# Patient Record
Sex: FEMALE | Race: WHITE | NOT HISPANIC OR LATINO | Employment: UNEMPLOYED | URBAN - METROPOLITAN AREA
[De-identification: names, ages, dates, MRNs, and addresses within clinical notes are randomized per-mention and may not be internally consistent; named-entity substitution may affect disease eponyms.]

---

## 2019-05-15 ENCOUNTER — OFFICE VISIT (OUTPATIENT)
Dept: GASTROENTEROLOGY | Facility: CLINIC | Age: 69
End: 2019-05-15

## 2019-05-15 VITALS — BODY MASS INDEX: 20.51 KG/M2 | WEIGHT: 127.6 LBS | HEIGHT: 66 IN | TEMPERATURE: 98.6 F

## 2019-05-15 DIAGNOSIS — R10.33 PERIUMBILICAL ABDOMINAL PAIN: Primary | ICD-10-CM

## 2019-05-15 DIAGNOSIS — Z90.49 H/O TOTAL COLECTOMY: ICD-10-CM

## 2019-05-15 DIAGNOSIS — K31.89 MASS OF STOMACH: ICD-10-CM

## 2019-05-15 DIAGNOSIS — Z85.038 HISTORY OF COLON CANCER: ICD-10-CM

## 2019-05-15 PROCEDURE — 99204 OFFICE O/P NEW MOD 45 MIN: CPT | Performed by: INTERNAL MEDICINE

## 2019-05-15 RX ORDER — MONTELUKAST SODIUM 10 MG/1
10 TABLET ORAL NIGHTLY
COMMUNITY
Start: 2019-03-01

## 2019-05-15 RX ORDER — DULOXETIN HYDROCHLORIDE 60 MG/1
60 CAPSULE, DELAYED RELEASE ORAL DAILY
COMMUNITY
Start: 2019-03-01

## 2019-05-15 RX ORDER — LEVOTHYROXINE SODIUM 0.03 MG/1
25 TABLET ORAL DAILY
COMMUNITY
Start: 2019-03-01

## 2019-05-15 RX ORDER — OMEPRAZOLE 40 MG/1
CAPSULE, DELAYED RELEASE ORAL
COMMUNITY
Start: 2019-03-01 | End: 2019-09-25

## 2019-05-15 RX ORDER — MELATONIN
1000 DAILY
COMMUNITY

## 2019-05-15 RX ORDER — LOPERAMIDE HCL 1 MG/7.5ML
2 SOLUTION ORAL
Qty: 237 ML | Refills: 5 | Status: SHIPPED | OUTPATIENT
Start: 2019-05-15 | End: 2019-12-02

## 2019-05-15 RX ORDER — TIOTROPIUM BROMIDE INHALATION SPRAY 3.12 UG/1
2 SPRAY, METERED RESPIRATORY (INHALATION) DAILY
COMMUNITY
Start: 2019-03-18

## 2019-05-15 RX ORDER — POTASSIUM CHLORIDE 1500 MG/1
10 TABLET, EXTENDED RELEASE ORAL DAILY
COMMUNITY
Start: 2019-03-01

## 2019-05-15 RX ORDER — BUSPIRONE HYDROCHLORIDE 10 MG/1
10 TABLET ORAL 3 TIMES DAILY
COMMUNITY
Start: 2019-03-01

## 2019-05-15 RX ORDER — ATORVASTATIN CALCIUM 40 MG/1
40 TABLET, FILM COATED ORAL DAILY
COMMUNITY
Start: 2019-03-01

## 2019-05-15 RX ORDER — TRAMADOL HYDROCHLORIDE 50 MG/1
TABLET ORAL
COMMUNITY
Start: 2019-04-16 | End: 2019-06-28 | Stop reason: SDUPTHER

## 2019-05-15 NOTE — PATIENT INSTRUCTIONS
Stop Gattex    Start citrucel twice a day to bulk stool    Ok for imodium up to 3 times a day to control stools    Boost/ensure 4 times per day    Labs today

## 2019-05-15 NOTE — PROGRESS NOTES
"Chief Complaint   Patient presents with   • Stomach mass   • Abdominal Pain       Subjective     HPI    Donna Banerjee is a 68 y.o. female with a past medical history noted below who presents for evaluation of abdominal pain, history of colon cancer, reports of a stomach.  Unfortunately, she has had varied medical care in various locations due to needing to live with relatives who live in different areas.  She is not the best historian either.  Her history is notable for microsatellite unstable colon cancer that was found in May 2016.  She had a right hemicolectomy at that time.  She then moved to [place] and apparently had a total colectomy there.  She has an ileostomy.  Then she moved to [facility] and apparently followed with Dr Dixon.    She is complaining to me today of abdominal pain.  Pain has been occurring since her resection in 2017.  Located mid abdomen, near her ostomy.  Constant in timing.  \"Intense\" sharp pain.  Better with tramadol.  Nothing worsens it.  Had a US with a PCP in [place] and was told that she has a stomach mass.      She reports constant high volume ostomy output since her ileostomy.  She denies that any part of her small bowel was removed.  Interestingly, she was put on Gattex by her doctor in [place] though she does not have a short gut.  She has not been on fiber nor Imodium to try to slow her ostomy output down.  She says that she has not noticed much change during the month that she has been on Gattex but that her stoma has increased in size and is puffier.  She says that she has had progressive weight loss, she says that she used to weigh 200 pounds prior to her surgery.    She moved back to [place] to live with a dtr and is establishing with Rae Gutierrez as her PCP    Smokes.  No alcohol.    Total colectomy, has had 2 c sections.    Not working, used to     No known family history of GI malignancy.    Past Medical History:   Diagnosis Date   • Anemia    • Ascites  "   • Breast cancer (CMS/[facility]) 2014   • Cancer (CMS/[facility]) 2016    colon cancer-stage II   • COPD (chronic obstructive pulmonary disease) (CMS/[facility])    • Depression    • Diverticulosis    • Emphysema lung (CMS/[facility])    • Gout    • Hyperlipidemia    • Thyroid disease          Current Outpatient Medications:   •  atorvastatin (LIPITOR) 40 MG tablet, , Disp: , Rfl:   •  busPIRone (BUSPAR) 10 MG tablet, , Disp: , Rfl:   •  cholecalciferol (VITAMIN D3) 1000 units tablet, Take 1,000 Units by mouth Daily., Disp: , Rfl:   •  DULoxetine (CYMBALTA) 60 MG capsule, , Disp: , Rfl:   •  KLOR-CON 20 MEQ CR tablet, , Disp: , Rfl:   •  levothyroxine (SYNTHROID, LEVOTHROID) 25 MCG tablet, , Disp: , Rfl:   •  MAGNESIUM OXIDE 400 PO, , Disp: , Rfl:   •  montelukast (SINGULAIR) 10 MG tablet, , Disp: , Rfl:   •  Multiple Vitamins-Minerals (MULTIVITAMIN ADULT PO), , Disp: , Rfl:   •  omeprazole (priLOSEC) 40 MG capsule, , Disp: , Rfl:   •  SPIRIVA RESPIMAT 2.5 MCG/ACT aerosol solution inhaler, , Disp: , Rfl:   •  traMADol (ULTRAM) 50 MG tablet, , Disp: , Rfl:   •  loperamide (IMODIUM A-D) 1 MG/7.5ML suspension, Take 15 mL by mouth 2 (Two) Times a Day Before Meals., Disp: 237 mL, Rfl: 5  •  methylcellulose oral powder, Take 10 application by mouth 2 (Two) Times a Day Before Meals., Disp: 84960 g, Rfl: 5    Allergies   Allergen Reactions   • Latex Unknown (See Comments)     unknown   • Sulfa Antibiotics Rash       Social History     Socioeconomic History   • Marital status:      Spouse name: Not on file   • Number of children: Not on file   • Years of education: Not on file   • Highest education level: Not on file   Tobacco Use   • Smoking status: Current Every Day Smoker     Packs/day: 0.25     Start date: 1960   • Smokeless tobacco: Never Used   Substance and Sexual Activity   • Alcohol use: No     Frequency: Never   • Drug use: Yes     Types: Marijuana       Family History   Family history unknown: Yes       Review of Systems    Constitutional: Positive for unexpected weight change. Negative for activity change, appetite change and fatigue.   HENT: Negative for sore throat and trouble swallowing.    Respiratory: Negative.    Cardiovascular: Negative.    Gastrointestinal: Positive for abdominal pain. Negative for abdominal distention and blood in stool.        High ostomy output   Endocrine: Negative for cold intolerance and heat intolerance.   Genitourinary: Negative for difficulty urinating, dysuria and frequency.        Decreased urine output   Musculoskeletal: Negative for arthralgias, back pain and myalgias.   Skin: Negative.    Hematological: Negative for adenopathy. Does not bruise/bleed easily.   All other systems reviewed and are negative.      Objective     Vitals:    05/15/19 1331   Temp: 98.6 °F (37 °C)         05/15/19  1331   Weight: 57.9 kg (127 lb 9.6 oz)     Body mass index is 20.6 kg/m².    Physical Exam   Constitutional: She is oriented to person, place, and time. No distress.   Thin, chronically ill-appearing   HENT:   Head: Normocephalic and atraumatic.   Right Ear: External ear normal.   Left Ear: External ear normal.   Nose: Nose normal.   Mouth/Throat: Oropharynx is clear and moist.   Missing multiple teeth in her lower mouth   Eyes: Conjunctivae and EOM are normal. Right eye exhibits no discharge. Left eye exhibits no discharge. No scleral icterus.   Neck: Normal range of motion. Neck supple. No thyromegaly present.   No supraclavicular adenopathy   Cardiovascular: Normal rate, regular rhythm, normal heart sounds and intact distal pulses. Exam reveals no gallop.   No murmur heard.  No lower extremity edema   Pulmonary/Chest: Effort normal and breath sounds normal. No respiratory distress. She has no wheezes.   Abdominal: Soft. Normal appearance and bowel sounds are normal. She exhibits no distension and no mass. There is no hepatosplenomegaly. There is no tenderness. There is no rigidity, no rebound and no  guarding. No hernia.   Ostomy with liquid brown stool, no palpable mass   Genitourinary:   Genitourinary Comments: Rectal exam deferred   Musculoskeletal: Normal range of motion. She exhibits no edema or tenderness.   No atrophy of upper or lower extremities.  Normal digits and nails of both hands.   Lymphadenopathy:     She has no cervical adenopathy.   Neurological: She is alert and oriented to person, place, and time. She displays no atrophy. Coordination normal.   Skin: Skin is warm and dry. No rash noted. She is not diaphoretic. No erythema.   Psychiatric: She has a normal mood and affect. Her behavior is normal. Judgment and thought content normal.   Vitals reviewed.      No results found for: WBC, RBC, HGB, HCT, MCV, MCH, MCHC, RDW, RDWSD, MPV, PLT, NEUTRORELPCT, LYMPHORELPCT, MONORELPCT, EOSRELPCT, BASORELPCT, AUTOIGPER, NEUTROABS, LYMPHSABS, MONOSABS, EOSABS, BASOSABS, AUTOIGNUM, NRBC    No results found for: GLUCOSE, NA, K, CO2, CL, ANIONGAP, CREATININE, BUN, BCR, CALCIUM, EGFRIFNONA, ALKPHOS, PROTEINTOT, ALT, AST, BILITOT, ALBUMIN, GLOB, LABIL2      Imaging Results (last 7 days)     ** No results found for the last 168 hours. **            No notes on file    Assessment/Plan    Abdominal pain    Status post ileostomy    History of colon cancer status post colectomy    Reports of mass in the stomach on ultrasound imaging    Plan  CBC, CMP today  CT of the abdomen and pelvis to further evaluate reports of her stomach mass  We will get Dr. Sanchez's records  Stop Gattex  Start fiber supplemenation, imodium to control ostomy output  Increase boost, ensure to 4 bottles daily-- samples given    Overall I am concerned about her lack of permanent housing as her multiple moves will complicate her medical care.    Donna was seen today for stomach mass and abdominal pain.    Diagnoses and all orders for this visit:    Periumbilical abdominal pain  -     CBC & Differential  -     Comprehensive Metabolic Panel  -      CT Abdomen Pelvis With Contrast; Future    History of colon cancer  -     CBC & Differential  -     Comprehensive Metabolic Panel  -     CT Abdomen Pelvis With Contrast; Future    H/O total colectomy  -     CT Abdomen Pelvis With Contrast; Future    Mass of stomach  -     CBC & Differential  -     Comprehensive Metabolic Panel  -     CT Abdomen Pelvis With Contrast; Future    Other orders  -     loperamide (IMODIUM A-D) 1 MG/7.5ML suspension; Take 15 mL by mouth 2 (Two) Times a Day Before Meals.  -     methylcellulose oral powder; Take 10 application by mouth 2 (Two) Times a Day Before Meals.        I have discussed the above plan with the patient.  They verbalize understanding and are in agreement with the plan.  They have been advised to contact the office for any questions, concerns, or changes related to their health.    Dictated utilizing Dragon dictation

## 2019-05-16 ENCOUNTER — TELEPHONE (OUTPATIENT)
Dept: GASTROENTEROLOGY | Facility: CLINIC | Age: 69
End: 2019-05-16

## 2019-05-16 DIAGNOSIS — Z85.038 HISTORY OF COLON CANCER: Primary | ICD-10-CM

## 2019-05-16 NOTE — TELEPHONE ENCOUNTER
Records received from [facility] physicians and scanned under media tab.  Dr Baker notified.

## 2019-05-16 NOTE — TELEPHONE ENCOUNTER
Called Dr Fowler at [phone] and spoke with Zuleyma and requested pt's records be faxed to [phone].

## 2019-05-16 NOTE — TELEPHONE ENCOUNTER
----- Message from Nelli Baker MD sent at 5/15/2019  4:43 PM EDT -----  She saw a Dr Cortes in [place]-- can we pls get records, thx

## 2019-05-20 ENCOUNTER — TELEPHONE (OUTPATIENT)
Dept: GASTROENTEROLOGY | Facility: CLINIC | Age: 69
End: 2019-05-20

## 2019-05-20 NOTE — TELEPHONE ENCOUNTER
----- Message from Rachel Campos, RN sent at 5/20/2019  1:21 PM EDT -----  Regarding: FW: Question-Patient Medication Discontinuation  Contact: [phone]      ----- Message -----  From: Francoise Kimbrough  Sent: 5/20/2019   1:19 PM  To: [facility]  Subject: Question-Patient Medication Discontinuation      Berry from [facility] would like a call back regarding medication discontinuation for patient.     ThanksFrancoise

## 2019-05-20 NOTE — TELEPHONE ENCOUNTER
Received call from Berry from [facility] advising that they have faxed a dc order for the gattex and need Dr Baker' signature. Adised will place form on Dr Baker desk and will fax when signed. She verb understanding.

## 2019-05-22 NOTE — TELEPHONE ENCOUNTER
Discontinuation order for gattex faxed to [facility] at [phone] and confirmation received.  Form scanned under media tab.

## 2019-06-27 NOTE — PROGRESS NOTES
Subjective Patient describes circuitous route of treatment over many years.  She is now returned to [place] to live with 1 of her daughters and wishes to have us resume her care.    REASON FOR CONSULTATION: Resumption of oncologic care  Provide an opinion on any further workup or treatment                             REQUESTING PHYSICIAN: SELIN Strange    RECORDS OBTAINED:  Records of the patients history including those obtained from the referring provider were reviewed and summarized in detail.    HISTORY OF PRESENT ILLNESS:  The patient is a 68 y.o. year old female who is here for an opinion about the above issue.    History of Present Illness       The patient is a 68-year-old female with a past history of stage I breast carcinoma, ER, HI negative, HER-2 positive in 2008 status post left mastectomy and neoadjuvant chemotherapy with 6 cycles of Taxotere and Carboplatin as well as 1 year of Herceptin.  Additional history includes stage II colon cancer in May 2016 undergoing open colectomy in [place]-T3 N0 M0 with no additional therapy except a subsequent history of elevated CEA level documented up to 54.6 March 21, 2017 and subsequent PET/CT negative.  Records indicate in May 2017 a MUTYH mutation documented and subsequent endoscopy performed though this became progressively more difficult to do leading to ileostomy placement ultimately and flexible sigmoidoscopy was thereafter relatively consistently.  She has not demonstrated any evidence recurrence undergoing CT scans in February 2018 and March 2018   Records include a PET/CT have not been performed  April 12, 2018 with mild increased activity in the left lower lobe pulmonary infiltrate thought to be inflammatory when compared to previous CT March 24, 2017.  More recently she had an ultrasound performed February 6, 2019 when a lump was felt in the abdominal wall anteriorly demonstrating a nonspecific mass deep to the anterior wall thought to  be a postoperative hematoma?  At the time she was living in [place] an and ultrasound (performed ) revealed a 4.8 cm x 3.4 cm x 0.8 cm hypoechoic mass with irregular margins just deep to the abdominal wall .  The patient states that she was never advised as to the significance of these findings but she was also moving from [place] back to [place].  It does appear that a follow-up CT was planned at least per schedule.     The patient has started with a new primary care physician with plans to adjust her pain medications, treatment of her depression and now she is seeking follow-up oncologic care.  Again her story is somewhat convoluted and we have spent considerable time trying to be certain that the above is accurate.         Past Medical History:   Diagnosis Date   • Acute kidney injury (CMS/HCC)    • Adenovirus infection    • Ascites    • Breast cancer (CMS/HCC)    • Cancer (CMS/HCC)     colon cancer-stage II   • Choledocholithiasis    • Colitis    • COPD (chronic obstructive pulmonary disease) (CMS/HCC)    • Depression    • Diverticulosis    • DJD (degenerative joint disease)    • E. coli UTI    • Emphysema lung (CMS/HCC)    • Enterovirus infection    • Fibromyalgia    • Gout    • H/O Anemia     Microcytic anemia   • H/O Streptococcus pneumoniae infection    • Herpes simplex type 1 infection    • History of urinary tract infection    • Hyperlipidemia    • Rhinovirus infection    • Staph aureus infection    • Thyroid disease     Hypothyroidism        Past Surgical History:   Procedure Laterality Date   • BRONCHOSCOPY  ,    •  SECTION     • CHOLECYSTECTOMY     • COLON RESECTION     • COLONOSCOPY  2016    colon cancer per patient   • ELBOW ARTHROPLASTY     • ERCP WITH SPHINCTEROTOMY/PAPILLOTOMY     • EXPLORATORY LAPAROTOMY      Ventral hernia repair   • ILEOSTOMY     • MASTECTOMY     • NASAL SEPTUM SURGERY     • PORTACATH PLACEMENT     • SIGMOIDOSCOPY     •  THYROIDECTOMY, PARTIAL     • TONSILLECTOMY          Current Outpatient Medications on File Prior to Visit   Medication Sig Dispense Refill   • atorvastatin (LIPITOR) 40 MG tablet      • busPIRone (BUSPAR) 10 MG tablet      • cholecalciferol (VITAMIN D3) 1000 units tablet Take 1,000 Units by mouth Daily.     • DULoxetine (CYMBALTA) 60 MG capsule      • KLOR-CON 20 MEQ CR tablet      • levothyroxine (SYNTHROID, LEVOTHROID) 25 MCG tablet      • MAGNESIUM OXIDE 400 PO      • montelukast (SINGULAIR) 10 MG tablet      • Multiple Vitamins-Minerals (MULTIVITAMIN ADULT PO)      • omeprazole (priLOSEC) 40 MG capsule      • [DISCONTINUED] traMADol (ULTRAM) 50 MG tablet      • loperamide (IMODIUM A-D) 1 MG/7.5ML suspension Take 15 mL by mouth 2 (Two) Times a Day Before Meals. 237 mL 5   • methylcellulose oral powder Take 10 application by mouth 2 (Two) Times a Day Before Meals. 15843 g 5   • SPIRIVA RESPIMAT 2.5 MCG/ACT aerosol solution inhaler        No current facility-administered medications on file prior to visit.         ALLERGIES:    Allergies   Allergen Reactions   • Latex Unknown (See Comments)     unknown   • Sulfa Antibiotics Rash        Social History     Socioeconomic History   • Marital status:      Spouse name: Not on file   • Number of children: Not on file   • Years of education: Not on file   • Highest education level: Not on file   Occupational History   • Occupation:      Employer: UNEMPLOYED   Tobacco Use   • Smoking status: Current Every Day Smoker     Packs/day: 0.25     Start date: 1960   • Smokeless tobacco: Never Used   Substance and Sexual Activity   • Alcohol use: No     Frequency: Never   • Drug use: Yes     Types: Marijuana   • Sexual activity: Defer        Family History   Problem Relation Age of Onset   • Asthma Brother    • Hyperlipidemia Daughter         Review of Systems   Constitutional: Positive for diaphoresis, fatigue and unexpected weight change.   HENT: Positive for  "hearing loss.    Eyes: Positive for visual disturbance.   Respiratory: Negative.    Cardiovascular: Negative.    Gastrointestinal: Positive for abdominal pain.   Endocrine: Positive for cold intolerance and heat intolerance.   Genitourinary: Negative.    Musculoskeletal: Positive for back pain.   Allergic/Immunologic: Negative.    Neurological: Negative.         Objective     Vitals:    06/28/19 1358   BP: 130/55   Pulse: 68   Resp: 18   Temp: 97.8 °F (36.6 °C)   TempSrc: Oral   SpO2: 100%   Weight: 59.3 kg (130 lb 12.8 oz)   Height: 167.6 cm (65.98\")  Comment: new patient height   PainSc:   2   PainLoc: Abdomen     Current Status 6/28/2019   ECOG score 0       Physical Exam   Constitutional: She is oriented to person, place, and time. She appears well-developed and well-nourished.   HENT:   Head: Normocephalic and atraumatic.   Mouth/Throat: Oropharynx is clear and moist.   Eyes: Conjunctivae and EOM are normal. Pupils are equal, round, and reactive to light.   Neck: Normal range of motion. Neck supple.   Cardiovascular: Normal rate, regular rhythm, normal heart sounds and intact distal pulses.   Pulmonary/Chest: Effort normal and breath sounds normal.   Abdominal: Soft. Bowel sounds are normal. She exhibits mass (Fullness felt just to the left of the umbilicus).   Ileostomy placement intact and functioning   Musculoskeletal: Normal range of motion.   Neurological: She is alert and oriented to person, place, and time.   Skin: Skin is warm and dry.   Psychiatric: She has a normal mood and affect. Her behavior is normal.         RECENT LABS:  Hematology WBC   Date Value Ref Range Status   06/28/2019 9.54 3.40 - 10.80 10*3/mm3 Final     RBC   Date Value Ref Range Status   06/28/2019 4.21 3.77 - 5.28 10*6/mm3 Final     Hemoglobin   Date Value Ref Range Status   06/28/2019 12.3 12.0 - 15.9 g/dL Final     Hematocrit   Date Value Ref Range Status   06/28/2019 38.6 34.0 - 46.6 % Final     Platelets   Date Value Ref Range " Status   06/28/2019 190 140 - 450 10*3/mm3 Final          Assessment/Plan        68-year-old female who presents for resumption of oncologic care.  This includes a stage I breast cancer in 2008 status post neoadjuvant therapy and Herceptin for the balance of the year following as well as a history of stage II colon carcinoma (T3 N0 M0) in May 2016.  She was not offered chemotherapy but did have an elevated CEA of uncertain significance through 3/2017.  She had a number of studies to document whether she had recurrent disease all negative but she was eventually determined to have a MUTYH mutation-evidently MUTYH associated polyposis (MAP) leading to recurrent endoscopies.  These became difficult to do secondary to inability to pass even pediatric scopes and eventually she required surgery and placement of ileostomy.  The patient has been in several locations including [place] for period of time, [facility] as well as [facility] with portions of her treatment provided at each location.  We have spent considerable time trying to piece of her history together today leading to the finding more recently in the last several months of potential mass in the anterior abdominal wall.  This was assessed by ultrasound (described above) though the assessment of this has, evidently, not been completed.  She now presents to our practice for follow-up and, under the circumstances, is requested to have a follow-up exam with a PET/CT rather than CT to determine whether the area in question are metabolically active.  Plan:  *PET/CT in the next 2 to 3 weeks  *Baseline CEA, CA 15-3 and CMP  *Pain medications refilled at present until the area in question can be delineated as to benign or malignant.  Upon return we will discuss how her pain management will continue.  *Follow-up approximately 5 days after PET/CT is completed  *Port maintenance- reinitiate port flush every 4 to 6 weeks

## 2019-06-28 ENCOUNTER — LAB (OUTPATIENT)
Dept: LAB | Facility: HOSPITAL | Age: 69
End: 2019-06-28

## 2019-06-28 ENCOUNTER — CONSULT (OUTPATIENT)
Dept: ONCOLOGY | Facility: CLINIC | Age: 69
End: 2019-06-28

## 2019-06-28 VITALS
RESPIRATION RATE: 18 BRPM | HEART RATE: 68 BPM | DIASTOLIC BLOOD PRESSURE: 55 MMHG | WEIGHT: 130.8 LBS | OXYGEN SATURATION: 100 % | BODY MASS INDEX: 21.02 KG/M2 | SYSTOLIC BLOOD PRESSURE: 130 MMHG | TEMPERATURE: 97.8 F | HEIGHT: 66 IN

## 2019-06-28 DIAGNOSIS — R19.04 ABDOMINAL MASS, LLQ (LEFT LOWER QUADRANT): ICD-10-CM

## 2019-06-28 DIAGNOSIS — Z90.49 H/O TOTAL COLECTOMY: Primary | ICD-10-CM

## 2019-06-28 DIAGNOSIS — Z85.038 HISTORY OF COLON CANCER: ICD-10-CM

## 2019-06-28 DIAGNOSIS — Z85.3 HISTORY OF BREAST CANCER: ICD-10-CM

## 2019-06-28 DIAGNOSIS — Z85.038 HISTORY OF COLON CANCER: Primary | ICD-10-CM

## 2019-06-28 LAB
ALBUMIN SERPL-MCNC: 4.5 G/DL (ref 3.5–5.2)
ALBUMIN/GLOB SERPL: 1.9 G/DL (ref 1.1–2.4)
ALP SERPL-CCNC: 80 U/L (ref 38–116)
ALT SERPL W P-5'-P-CCNC: 18 U/L (ref 0–33)
ANION GAP SERPL CALCULATED.3IONS-SCNC: 10.5 MMOL/L (ref 5–15)
AST SERPL-CCNC: 24 U/L (ref 0–32)
BASOPHILS # BLD AUTO: 0.06 10*3/MM3 (ref 0–0.2)
BASOPHILS NFR BLD AUTO: 0.6 % (ref 0–1.5)
BILIRUB SERPL-MCNC: 0.4 MG/DL (ref 0.2–1.2)
BUN BLD-MCNC: 24 MG/DL (ref 6–20)
BUN/CREAT SERPL: 16.1 (ref 7.3–30)
CALCIUM SPEC-SCNC: 9.9 MG/DL (ref 8.5–10.2)
CANCER AG15-3 SERPL-ACNC: 17 U/ML
CEA SERPL-MCNC: 14.4 NG/ML
CHLORIDE SERPL-SCNC: 103 MMOL/L (ref 98–107)
CO2 SERPL-SCNC: 25.5 MMOL/L (ref 22–29)
CREAT BLD-MCNC: 1.49 MG/DL (ref 0.6–1.1)
DEPRECATED RDW RBC AUTO: 42.7 FL (ref 37–54)
EOSINOPHIL # BLD AUTO: 0.53 10*3/MM3 (ref 0–0.4)
EOSINOPHIL NFR BLD AUTO: 5.6 % (ref 0.3–6.2)
ERYTHROCYTE [DISTWIDTH] IN BLOOD BY AUTOMATED COUNT: 12.9 % (ref 12.3–15.4)
GFR SERPL CREATININE-BSD FRML MDRD: 35 ML/MIN/1.73
GLOBULIN UR ELPH-MCNC: 2.4 GM/DL (ref 1.8–3.5)
GLUCOSE BLD-MCNC: 92 MG/DL (ref 74–124)
HCT VFR BLD AUTO: 38.6 % (ref 34–46.6)
HGB BLD-MCNC: 12.3 G/DL (ref 12–15.9)
IMM GRANULOCYTES # BLD AUTO: 0.04 10*3/MM3 (ref 0–0.05)
IMM GRANULOCYTES NFR BLD AUTO: 0.4 % (ref 0–0.5)
LYMPHOCYTES # BLD AUTO: 1.61 10*3/MM3 (ref 0.7–3.1)
LYMPHOCYTES NFR BLD AUTO: 16.9 % (ref 19.6–45.3)
MCH RBC QN AUTO: 29.2 PG (ref 26.6–33)
MCHC RBC AUTO-ENTMCNC: 31.9 G/DL (ref 31.5–35.7)
MCV RBC AUTO: 91.7 FL (ref 79–97)
MONOCYTES # BLD AUTO: 0.62 10*3/MM3 (ref 0.1–0.9)
MONOCYTES NFR BLD AUTO: 6.5 % (ref 5–12)
NEUTROPHILS # BLD AUTO: 6.68 10*3/MM3 (ref 1.7–7)
NEUTROPHILS NFR BLD AUTO: 70 % (ref 42.7–76)
NRBC BLD AUTO-RTO: 0 /100 WBC (ref 0–0.2)
PLATELET # BLD AUTO: 190 10*3/MM3 (ref 140–450)
PMV BLD AUTO: 9.1 FL (ref 6–12)
POTASSIUM BLD-SCNC: 4.9 MMOL/L (ref 3.5–4.7)
PROT SERPL-MCNC: 6.9 G/DL (ref 6.3–8)
RBC # BLD AUTO: 4.21 10*6/MM3 (ref 3.77–5.28)
SODIUM BLD-SCNC: 139 MMOL/L (ref 134–145)
WBC NRBC COR # BLD: 9.54 10*3/MM3 (ref 3.4–10.8)

## 2019-06-28 PROCEDURE — 36415 COLL VENOUS BLD VENIPUNCTURE: CPT | Performed by: INTERNAL MEDICINE

## 2019-06-28 PROCEDURE — 85025 COMPLETE CBC W/AUTO DIFF WBC: CPT | Performed by: INTERNAL MEDICINE

## 2019-06-28 PROCEDURE — 82378 CARCINOEMBRYONIC ANTIGEN: CPT | Performed by: INTERNAL MEDICINE

## 2019-06-28 PROCEDURE — 80053 COMPREHEN METABOLIC PANEL: CPT | Performed by: INTERNAL MEDICINE

## 2019-06-28 PROCEDURE — 99205 OFFICE O/P NEW HI 60 MIN: CPT | Performed by: INTERNAL MEDICINE

## 2019-06-28 PROCEDURE — 86300 IMMUNOASSAY TUMOR CA 15-3: CPT | Performed by: INTERNAL MEDICINE

## 2019-06-28 RX ORDER — TRAMADOL HYDROCHLORIDE 50 MG/1
50 TABLET ORAL EVERY 6 HOURS PRN
Qty: 120 TABLET | Refills: 0 | Status: SHIPPED | OUTPATIENT
Start: 2019-06-28 | End: 2019-08-15 | Stop reason: SDUPTHER

## 2019-07-16 ENCOUNTER — OFFICE VISIT (OUTPATIENT)
Dept: GASTROENTEROLOGY | Facility: CLINIC | Age: 69
End: 2019-07-16

## 2019-07-16 VITALS
BODY MASS INDEX: 20.93 KG/M2 | WEIGHT: 130.2 LBS | SYSTOLIC BLOOD PRESSURE: 102 MMHG | DIASTOLIC BLOOD PRESSURE: 60 MMHG | HEIGHT: 66 IN | TEMPERATURE: 97.8 F

## 2019-07-16 DIAGNOSIS — R10.33 PERIUMBILICAL ABDOMINAL PAIN: ICD-10-CM

## 2019-07-16 DIAGNOSIS — R19.04 ABDOMINAL MASS, LLQ (LEFT LOWER QUADRANT): Primary | ICD-10-CM

## 2019-07-16 DIAGNOSIS — D12.6 POLYPOSIS ASSOCIATED WITH HETEROZYGOUS MUTATION IN MUTYH GENE: ICD-10-CM

## 2019-07-16 DIAGNOSIS — Z90.49 H/O TOTAL COLECTOMY: ICD-10-CM

## 2019-07-16 PROCEDURE — 99214 OFFICE O/P EST MOD 30 MIN: CPT | Performed by: INTERNAL MEDICINE

## 2019-07-16 RX ORDER — SODIUM CHLORIDE, SODIUM LACTATE, POTASSIUM CHLORIDE, CALCIUM CHLORIDE 600; 310; 30; 20 MG/100ML; MG/100ML; MG/100ML; MG/100ML
30 INJECTION, SOLUTION INTRAVENOUS CONTINUOUS
Status: CANCELLED | OUTPATIENT
Start: 2019-09-16

## 2019-07-16 NOTE — PATIENT INSTRUCTIONS
Schedule the EGD    Get the PET scan    Fiber daily for one week, increase to twice daily    Continue to monitor weight    Stop the magnesium supplement    For any additional questions, concerns or changes to your condition after today's office visit please contact the office at [phone].

## 2019-07-16 NOTE — PROGRESS NOTES
Chief Complaint   Patient presents with   • Follow-up   • Periumbilical abdominal pain       Subjective     HPI    Donna Banerjee is a 68 y.o. female with a past medical history noted below who presents for follow-up of history of colon cancer status post ileostomy, MUTYH mutation/ MUTYH associated polyposis (MAP), increased ileostomy output, chronic abdominal pain, reports of abdominal wall mass.      She has never had an EGD.    Remains with chronic pain around her incisions.  Mid abdominal in location.  Now taking tramadol 4 times a day to treat it.    She saw Dr Turner, plans for PET scan later this month.      Eating one meal per day, ensure shakes.      Still emptying ostomy about 3-4 times per day.  She is taking magnesium for a history of leg cramps    Past Medical History:   Diagnosis Date   • Acute kidney injury (CMS/HCC)    • Adenovirus infection    • Ascites    • Breast cancer (CMS/HCC) 2014   • Cancer (CMS/HCC) 2016    colon cancer-stage II   • Choledocholithiasis    • Colitis    • COPD (chronic obstructive pulmonary disease) (CMS/HCC)    • Depression    • Diverticulosis    • DJD (degenerative joint disease)    • E. coli UTI    • Emphysema lung (CMS/HCC)    • Emphysema of lung (CMS/HCC)    • Enterovirus infection    • Fibromyalgia    • Gout    • H/O Anemia     Microcytic anemia   • H/O Streptococcus pneumoniae infection 2014   • Herpes simplex type 1 infection    • History of urinary tract infection    • Hyperlipidemia    • Rhinovirus infection    • Staph aureus infection    • Thyroid disease     Hypothyroidism         Current Outpatient Medications:   •  atorvastatin (LIPITOR) 40 MG tablet, , Disp: , Rfl:   •  busPIRone (BUSPAR) 10 MG tablet, , Disp: , Rfl:   •  cholecalciferol (VITAMIN D3) 1000 units tablet, Take 1,000 Units by mouth Daily., Disp: , Rfl:   •  DULoxetine (CYMBALTA) 60 MG capsule, , Disp: , Rfl:   •  levothyroxine (SYNTHROID, LEVOTHROID) 25 MCG tablet, , Disp: , Rfl:   •  loperamide  (IMODIUM A-D) 1 MG/7.5ML suspension, Take 15 mL by mouth 2 (Two) Times a Day Before Meals., Disp: 237 mL, Rfl: 5  •  MAGNESIUM OXIDE 400 PO, , Disp: , Rfl:   •  montelukast (SINGULAIR) 10 MG tablet, , Disp: , Rfl:   •  Multiple Vitamins-Minerals (MULTIVITAMIN ADULT PO), , Disp: , Rfl:   •  omeprazole (priLOSEC) 40 MG capsule, , Disp: , Rfl:   •  SPIRIVA RESPIMAT 2.5 MCG/ACT aerosol solution inhaler, , Disp: , Rfl:   •  traMADol (ULTRAM) 50 MG tablet, Take 1 tablet by mouth Every 6 (Six) Hours As Needed for Moderate Pain ., Disp: 120 tablet, Rfl: 0  •  KLOR-CON 20 MEQ CR tablet, , Disp: , Rfl:   •  methylcellulose oral powder, Take 10 application by mouth 2 (Two) Times a Day Before Meals., Disp: 36128 g, Rfl: 5    Allergies   Allergen Reactions   • Latex Unknown (See Comments)     unknown   • Sulfa Antibiotics Rash       Social History     Socioeconomic History   • Marital status:      Spouse name: Not on file   • Number of children: Not on file   • Years of education: High school   • Highest education level: Not on file   Occupational History   • Occupation:      Employer: UNEMPLOYED   Tobacco Use   • Smoking status: Current Every Day Smoker     Packs/day: 0.25     Types: Cigarettes     Start date: 1960   • Smokeless tobacco: Never Used   Substance and Sexual Activity   • Alcohol use: No     Frequency: Never   • Drug use: Yes     Types: Marijuana   • Sexual activity: Defer       Family History   Problem Relation Age of Onset   • Asthma Brother    • Hyperlipidemia Daughter    • Thyroid cancer Daughter    • Brain cancer Father        Review of Systems   Constitutional: Negative for activity change, appetite change, chills and fever.   HENT: Negative for trouble swallowing.    Respiratory: Negative.    Cardiovascular: Negative.  Negative for chest pain.   Gastrointestinal: Positive for abdominal pain. Negative for abdominal distention, anal bleeding, constipation, diarrhea, nausea and vomiting.         Increased ostomy output   Genitourinary: Negative for dysuria, frequency and hematuria.       Objective     Vitals:    07/16/19 1255   BP: 102/60   Temp: 97.8 °F (36.6 °C)         07/16/19  1255   Weight: 59.1 kg (130 lb 3.2 oz)     Body mass index is 21.01 kg/m².    Physical Exam   Constitutional: She is oriented to person, place, and time. She appears well-developed and well-nourished. No distress.   HENT:   Head: Normocephalic and atraumatic.   Right Ear: External ear normal.   Left Ear: External ear normal.   Nose: Nose normal.   Mouth/Throat: Oropharynx is clear and moist.   Eyes: Conjunctivae and EOM are normal. Right eye exhibits no discharge. Left eye exhibits no discharge. No scleral icterus.   Neck: Normal range of motion. Neck supple. No thyromegaly present.   No supraclavicular adenopathy   Cardiovascular: Normal rate, regular rhythm, normal heart sounds and intact distal pulses. Exam reveals no gallop.   No murmur heard.  No lower extremity edema   Pulmonary/Chest: Effort normal and breath sounds normal. No respiratory distress. She has no wheezes.   Abdominal: Soft. Normal appearance and bowel sounds are normal. She exhibits no distension and no mass. There is no hepatosplenomegaly. There is tenderness. There is no rigidity, no rebound and no guarding. No hernia.   Ostomy with brown stool, mid line scar   Genitourinary:   Genitourinary Comments: Rectal exam deferred   Musculoskeletal: Normal range of motion. She exhibits no edema or tenderness.   No atrophy of upper or lower extremities.  Normal digits and nails of both hands.   Lymphadenopathy:     She has no cervical adenopathy.   Neurological: She is alert and oriented to person, place, and time. She displays no atrophy. Coordination normal.   Skin: Skin is warm and dry. No rash noted. She is not diaphoretic. No erythema.   Psychiatric: She has a normal mood and affect. Her behavior is normal. Judgment and thought content normal.   Vitals  reviewed.      WBC   Date Value Ref Range Status   06/28/2019 9.54 3.40 - 10.80 10*3/mm3 Final     RBC   Date Value Ref Range Status   06/28/2019 4.21 3.77 - 5.28 10*6/mm3 Final     Hemoglobin   Date Value Ref Range Status   06/28/2019 12.3 12.0 - 15.9 g/dL Final     Hematocrit   Date Value Ref Range Status   06/28/2019 38.6 34.0 - 46.6 % Final     MCV   Date Value Ref Range Status   06/28/2019 91.7 79.0 - 97.0 fL Final     MCH   Date Value Ref Range Status   06/28/2019 29.2 26.6 - 33.0 pg Final     MCHC   Date Value Ref Range Status   06/28/2019 31.9 31.5 - 35.7 g/dL Final     RDW   Date Value Ref Range Status   06/28/2019 12.9 12.3 - 15.4 % Final     RDW-SD   Date Value Ref Range Status   06/28/2019 42.7 37.0 - 54.0 fl Final     MPV   Date Value Ref Range Status   06/28/2019 9.1 6.0 - 12.0 fL Final     Platelets   Date Value Ref Range Status   06/28/2019 190 140 - 450 10*3/mm3 Final     Neutrophil %   Date Value Ref Range Status   06/28/2019 70.0 42.7 - 76.0 % Final     Lymphocyte %   Date Value Ref Range Status   06/28/2019 16.9 (L) 19.6 - 45.3 % Final     Monocyte %   Date Value Ref Range Status   06/28/2019 6.5 5.0 - 12.0 % Final     Eosinophil %   Date Value Ref Range Status   06/28/2019 5.6 0.3 - 6.2 % Final     Basophil %   Date Value Ref Range Status   06/28/2019 0.6 0.0 - 1.5 % Final     Immature Grans %   Date Value Ref Range Status   06/28/2019 0.4 0.0 - 0.5 % Final     Neutrophils, Absolute   Date Value Ref Range Status   06/28/2019 6.68 1.70 - 7.00 10*3/mm3 Final     Lymphocytes, Absolute   Date Value Ref Range Status   06/28/2019 1.61 0.70 - 3.10 10*3/mm3 Final     Monocytes, Absolute   Date Value Ref Range Status   06/28/2019 0.62 0.10 - 0.90 10*3/mm3 Final     Eosinophils, Absolute   Date Value Ref Range Status   06/28/2019 0.53 (H) 0.00 - 0.40 10*3/mm3 Final     Basophils, Absolute   Date Value Ref Range Status   06/28/2019 0.06 0.00 - 0.20 10*3/mm3 Final     Immature Grans, Absolute   Date Value  Ref Range Status   06/28/2019 0.04 0.00 - 0.05 10*3/mm3 Final     nRBC   Date Value Ref Range Status   06/28/2019 0.0 0.0 - 0.2 /100 WBC Final       Glucose   Date Value Ref Range Status   06/28/2019 92 74 - 124 mg/dL Final     Sodium   Date Value Ref Range Status   06/28/2019 139 134 - 145 mmol/L Final     Potassium   Date Value Ref Range Status   06/28/2019 4.9 (H) 3.5 - 4.7 mmol/L Final     CO2   Date Value Ref Range Status   06/28/2019 25.5 22.0 - 29.0 mmol/L Final     Chloride   Date Value Ref Range Status   06/28/2019 103 98 - 107 mmol/L Final     Anion Gap   Date Value Ref Range Status   06/28/2019 10.5 5.0 - 15.0 mmol/L Final     Creatinine   Date Value Ref Range Status   06/28/2019 1.49 (H) 0.60 - 1.10 mg/dL Final     BUN   Date Value Ref Range Status   06/28/2019 24 (H) 6 - 20 mg/dL Final     BUN/Creatinine Ratio   Date Value Ref Range Status   06/28/2019 16.1 7.3 - 30.0 Final     Calcium   Date Value Ref Range Status   06/28/2019 9.9 8.5 - 10.2 mg/dL Final     eGFR Non  Amer   Date Value Ref Range Status   06/28/2019 35 (L) >60 mL/min/1.73 Final     Alkaline Phosphatase   Date Value Ref Range Status   06/28/2019 80 38 - 116 U/L Final     Total Protein   Date Value Ref Range Status   06/28/2019 6.9 6.3 - 8.0 g/dL Final     ALT (SGPT)   Date Value Ref Range Status   06/28/2019 18 0 - 33 U/L Final     AST (SGOT)   Date Value Ref Range Status   06/28/2019 24 0 - 32 U/L Final     Total Bilirubin   Date Value Ref Range Status   06/28/2019 0.4 0.2 - 1.2 mg/dL Final     Albumin   Date Value Ref Range Status   06/28/2019 4.50 3.50 - 5.20 g/dL Final     Globulin   Date Value Ref Range Status   06/28/2019 2.4 1.8 - 3.5 gm/dL Final         Imaging Results (last 7 days)     ** No results found for the last 168 hours. **            No notes on file    Assessment/Plan    MUTHY mutation: She has never had an EGD; she is at risk of duodenal adenomas and needs to be screened as such    History of colectomy with  ileostomy: Still with increased ostomy output but stable.  Interestingly she is on a magnesium supplement.  She has not started the fiber for stool blocking as yet    Abdominal mass: Seen on March ultrasound.  She was to get the CT scan but does have a PET scan scheduled later this month    Abdominal pain: Chronic issue following surgeries, suspect secondary to scar tissue, postsurgical pain    Plan  EGD for further evaluation/screening for duodenal adenomas    Advised to stop magnesium supplement    Advised to start her fiber supplementation and increase to twice weekly    We will follow-up PET scan        Donna was seen today for follow-up and periumbilical abdominal pain.    Diagnoses and all orders for this visit:    Abdominal mass, LLQ (left lower quadrant)    Periumbilical abdominal pain  -     Case Request; Standing  -     Follow Anesthesia Guidelines / Standing Orders; Future  -     Obtain Informed Consent; Future  -     Implement Anesthesia Orders Day of Procedure; Standing  -     Obtain Informed Consent; Standing  -     lactated ringers infusion  -     Case Request    H/O total colectomy    Polyposis associated with heterozygous mutation in MUTYH gene  -     Case Request; Standing  -     Follow Anesthesia Guidelines / Standing Orders; Future  -     Obtain Informed Consent; Future  -     Implement Anesthesia Orders Day of Procedure; Standing  -     Obtain Informed Consent; Standing  -     lactated ringers infusion  -     Case Request        I have discussed the above plan with the patient.  They verbalize understanding and are in agreement with the plan.  They have been advised to contact the office for any questions, concerns, or changes related to their health.    Dictated utilizing Dragon dictation

## 2019-07-29 ENCOUNTER — TELEPHONE (OUTPATIENT)
Dept: GENERAL RADIOLOGY | Facility: HOSPITAL | Age: 69
End: 2019-07-29

## 2019-07-29 DIAGNOSIS — R19.04 ABDOMINAL MASS, LLQ (LEFT LOWER QUADRANT): Primary | ICD-10-CM

## 2019-07-29 DIAGNOSIS — Z85.038 HISTORY OF COLON CANCER: ICD-10-CM

## 2019-07-29 DIAGNOSIS — Z85.3 HISTORY OF BREAST CANCER: ICD-10-CM

## 2019-07-29 NOTE — TELEPHONE ENCOUNTER
----- Message from Isatu Nguyen sent at 7/29/2019  1:42 PM EDT -----  Regarding: missed pet scan  8/1 appt

## 2019-08-01 ENCOUNTER — APPOINTMENT (OUTPATIENT)
Dept: LAB | Facility: HOSPITAL | Age: 69
End: 2019-08-01

## 2019-08-01 ENCOUNTER — APPOINTMENT (OUTPATIENT)
Dept: ONCOLOGY | Facility: HOSPITAL | Age: 69
End: 2019-08-01

## 2019-08-01 ENCOUNTER — APPOINTMENT (OUTPATIENT)
Dept: ONCOLOGY | Facility: CLINIC | Age: 69
End: 2019-08-01

## 2019-08-08 ENCOUNTER — HOSPITAL ENCOUNTER (OUTPATIENT)
Dept: PET IMAGING | Facility: HOSPITAL | Age: 69
Discharge: HOME | End: 2019-08-08

## 2019-08-08 ENCOUNTER — HOSPITAL ENCOUNTER (OUTPATIENT)
Dept: PET IMAGING | Facility: HOSPITAL | Age: 69
Discharge: HOME | End: 2019-08-08
Admitting: INTERNAL MEDICINE

## 2019-08-08 DIAGNOSIS — Z85.3 HISTORY OF BREAST CANCER: ICD-10-CM

## 2019-08-08 DIAGNOSIS — R19.04 ABDOMINAL MASS, LLQ (LEFT LOWER QUADRANT): ICD-10-CM

## 2019-08-08 DIAGNOSIS — Z85.038 HISTORY OF COLON CANCER: ICD-10-CM

## 2019-08-08 DIAGNOSIS — Z90.49 H/O TOTAL COLECTOMY: ICD-10-CM

## 2019-08-08 LAB — GLUCOSE BLDC GLUCOMTR-MCNC: 101 MG/DL (ref 70–130)

## 2019-08-08 PROCEDURE — A9552 F18 FDG: HCPCS | Performed by: INTERNAL MEDICINE

## 2019-08-08 PROCEDURE — 78815 PET IMAGE W/CT SKULL-THIGH: CPT

## 2019-08-08 PROCEDURE — 0 FLUDEOXYGLUCOSE F18 SOLUTION: Performed by: INTERNAL MEDICINE

## 2019-08-08 PROCEDURE — 82962 GLUCOSE BLOOD TEST: CPT

## 2019-08-08 RX ADMIN — FLUDEOXYGLUCOSE F18 1 DOSE: 300 INJECTION INTRAVENOUS at 13:06

## 2019-08-14 NOTE — PROGRESS NOTES
Subjective Patient describes circuitous route of treatment over many years.  She is now returned to [place] to live with 1 of her daughters and wishes to have us resume her care.    REASON FOR CONSULTATION: Resumption of oncologic care  Provide an opinion on any further workup or treatment                             REQUESTING PHYSICIAN: SELIN Strange    RECORDS OBTAINED:  Records of the patients history including those obtained from the referring provider were reviewed and summarized in detail.    HISTORY OF PRESENT ILLNESS:  The patient is a 68 y.o. year old female who is here for an opinion about the above issue.    History of Present Illness       The patient is a 68-year-old female with a past history of stage I breast carcinoma, ER, ID negative, HER-2 positive in 2008 status post left mastectomy and neoadjuvant chemotherapy with 6 cycles of Taxotere and Carboplatin as well as 1 year of Herceptin.  Additional history includes stage II colon cancer in May 2016 undergoing open colectomy in [place]-T3 N0 M0 with no additional therapy except a subsequent history of elevated CEA level documented up to 54.6 March 21, 2017 and subsequent PET/CT negative.  Records indicate in May 2017 a MUTYH mutation documented and subsequent endoscopy performed though this became progressively more difficult to do leading to ileostomy placement ultimately and flexible sigmoidoscopy was thereafter relatively consistently.  She has not demonstrated any evidence recurrence undergoing CT scans in February 2018 and March 2018   Records include a PET/CT have not been performed  April 12, 2018 with mild increased activity in the left lower lobe pulmonary infiltrate thought to be inflammatory when compared to previous CT March 24, 2017.  More recently she had an ultrasound performed February 6, 2019 when a lump was felt in the abdominal wall anteriorly demonstrating a nonspecific mass deep to the anterior wall thought to  be a postoperative hematoma?  At the time she was living in [place] an and ultrasound (performed March 20) revealed a 4.8 cm x 3.4 cm x 0.8 cm hypoechoic mass with irregular margins just deep to the abdominal wall .  The patient states that she was never advised as to the significance of these findings but she was also moving from [place] back to [place].  It does appear that a follow-up CT was planned at least per schedule.     The patient has started with a new primary care physician with plans to adjust her pain medications, treatment of her depression and now she is seeking follow-up oncologic care.  Again her story is somewhat convoluted and we have spent considerable time trying to be certain that the above is accurate.  The patient went on to undergo testing including serum chemistries with BUN/creatinine 24 and 1.49, normal LFTs, CEA of 14.4, CA 15-3 of 17.0.  A PET/CT was also obtained August 8, 2019 demonstrating physiologic distribution in the neck chest abdomen and pelvis with postoperative changes.  A previous focal area of consolidation left lung base has shown mild uptake which has cleared.  She status post left mastectomy, status post subtotal colectomy, numerous diverticuli are seen within the sigmoid colon and ileostomy present in the right lower quadrant.  There are, fortunately, no finding of abnormal masses in the abdomen or pelvis.  The patient is to undergo endoscopy next month and she is encouraged to do so.         Past Medical History:   Diagnosis Date   • Acute kidney injury (CMS/HCC)    • Adenovirus infection    • Ascites    • Breast cancer (CMS/HCC) 2014   • Cancer (CMS/HCC) 2016    colon cancer-stage II   • Choledocholithiasis    • Colitis    • COPD (chronic obstructive pulmonary disease) (CMS/HCC)    • Depression    • Diverticulosis    • DJD (degenerative joint disease)    • E. coli UTI    • Emphysema lung (CMS/HCC)    • Emphysema of lung (CMS/HCC)    • Enterovirus infection    •  Fibromyalgia    • Gout    • H/O Anemia     Microcytic anemia   • H/O Streptococcus pneumoniae infection    • Herpes simplex type 1 infection    • History of urinary tract infection    • Hyperlipidemia    • Rhinovirus infection    • Staph aureus infection    • Thyroid disease     Hypothyroidism        Past Surgical History:   Procedure Laterality Date   • BRONCHOSCOPY  ,    •  SECTION     • CHOLECYSTECTOMY     • COLON RESECTION     • COLONOSCOPY  2016    colon cancer per patient   • ELBOW ARTHROPLASTY     • ERCP WITH SPHINCTEROTOMY/PAPILLOTOMY     • EXPLORATORY LAPAROTOMY      Ventral hernia repair   • ILEOSTOMY     • MASTECTOMY     • NASAL SEPTUM SURGERY     • PORTACATH PLACEMENT     • SIGMOIDOSCOPY     • THYROIDECTOMY, PARTIAL     • TONSILLECTOMY          Current Outpatient Medications on File Prior to Visit   Medication Sig Dispense Refill   • atorvastatin (LIPITOR) 40 MG tablet      • busPIRone (BUSPAR) 10 MG tablet      • cholecalciferol (VITAMIN D3) 1000 units tablet Take 1,000 Units by mouth Daily.     • DULoxetine (CYMBALTA) 60 MG capsule      • KLOR-CON 20 MEQ CR tablet      • levothyroxine (SYNTHROID, LEVOTHROID) 25 MCG tablet      • loperamide (IMODIUM A-D) 1 MG/7.5ML suspension Take 15 mL by mouth 2 (Two) Times a Day Before Meals. 237 mL 5   • MAGNESIUM OXIDE 400 PO      • methylcellulose oral powder Take 10 application by mouth 2 (Two) Times a Day Before Meals. 44578 g 5   • montelukast (SINGULAIR) 10 MG tablet      • Multiple Vitamins-Minerals (MULTIVITAMIN ADULT PO)      • omeprazole (priLOSEC) 40 MG capsule      • SPIRIVA RESPIMAT 2.5 MCG/ACT aerosol solution inhaler      • [DISCONTINUED] traMADol (ULTRAM) 50 MG tablet Take 1 tablet by mouth Every 6 (Six) Hours As Needed for Moderate Pain . 120 tablet 0     No current facility-administered medications on file prior to visit.         ALLERGIES:    Allergies   Allergen Reactions   • Latex Unknown (See Comments)      "unknown   • Sulfa Antibiotics Rash        Social History     Socioeconomic History   • Marital status:      Spouse name: Not on file   • Number of children: Not on file   • Years of education: High school   • Highest education level: Not on file   Occupational History   • Occupation:      Employer: UNEMPLOYED   Tobacco Use   • Smoking status: Current Every Day Smoker     Packs/day: 0.25     Types: Cigarettes     Start date: 1960   • Smokeless tobacco: Never Used   Substance and Sexual Activity   • Alcohol use: No     Frequency: Never   • Drug use: Yes     Types: Marijuana   • Sexual activity: Defer        Family History   Problem Relation Age of Onset   • Asthma Brother    • Hyperlipidemia Daughter    • Thyroid cancer Daughter    • Brain cancer Father         Review of Systems   Constitutional: Positive for diaphoresis, fatigue and unexpected weight change.   HENT: Positive for hearing loss.    Eyes: Positive for visual disturbance.   Respiratory: Negative.    Cardiovascular: Negative.    Gastrointestinal: Positive for abdominal pain.   Endocrine: Positive for cold intolerance and heat intolerance.   Genitourinary: Negative.    Musculoskeletal: Positive for back pain.   Allergic/Immunologic: Negative.    Neurological: Negative.         Objective     Vitals:    08/15/19 1327   BP: 135/68   Pulse: 110   Resp: 18   Temp: 98.7 °F (37.1 °C)   TempSrc: Oral   SpO2: 90%   Weight: 61.2 kg (135 lb)   Height: 167.6 cm (65.98\")   PainSc:   5   PainLoc: Abdomen     Current Status 8/15/2019   ECOG score 0       Physical Exam   Constitutional: She is oriented to person, place, and time. She appears well-developed and well-nourished.   HENT:   Head: Normocephalic and atraumatic.   Mouth/Throat: Oropharynx is clear and moist.   Eyes: Conjunctivae and EOM are normal. Pupils are equal, round, and reactive to light.   Neck: Normal range of motion. Neck supple.   Cardiovascular: Normal rate, regular rhythm, normal heart " sounds and intact distal pulses.   Pulmonary/Chest: Effort normal and breath sounds normal.   Abdominal: Soft. Bowel sounds are normal. She exhibits mass (Fullness felt just to the left of the umbilicus).   Ileostomy placement intact and functioning   Musculoskeletal: Normal range of motion.   Neurological: She is alert and oriented to person, place, and time.   Skin: Skin is warm and dry.   Psychiatric: She has a normal mood and affect. Her behavior is normal.         RECENT LABS:  Hematology WBC   Date Value Ref Range Status   08/15/2019 8.04 3.40 - 10.80 10*3/mm3 Final     RBC   Date Value Ref Range Status   08/15/2019 4.22 3.77 - 5.28 10*6/mm3 Final     Hemoglobin   Date Value Ref Range Status   08/15/2019 12.3 12.0 - 15.9 g/dL Final     Hematocrit   Date Value Ref Range Status   08/15/2019 38.2 34.0 - 46.6 % Final     Platelets   Date Value Ref Range Status   08/15/2019 199 140 - 450 10*3/mm3 Final          Assessment/Plan        68-year-old female who presents for resumption of oncologic care.  This includes a stage I breast cancer in 2008 status post neoadjuvant therapy and Herceptin for the balance of the year following as well as a history of stage II colon carcinoma (T3 N0 M0) in May 2016.  She was not offered chemotherapy but did have an elevated CEA of uncertain significance through 3/2017.  She had a number of studies to document whether she had recurrent disease all negative but she was eventually determined to have a MUTYH mutation-evidently MUTYH associated polyposis (MAP) leading to recurrent endoscopies.  These became difficult to do secondary to inability to pass even pediatric scopes and eventually she required surgery and placement of ileostomy.  The patient has been in several locations including [place] for period of time, [facility] as well as [facility] with portions of her treatment provided at each location.  We have spent considerable time trying to piece of her  history together today leading to the finding more recently in the last several months of potential mass in the anterior abdominal wall.  This was assessed by ultrasound (described above) though the assessment of this has, evidently, not been completed.  She now presents to our practice for follow-up and, under the circumstances, is requested to have a follow-up exam with a PET/CT rather than CT to determine whether the area in question are metabolically active.  Plans were made for the patient undergo a PET/CT and baseline studies as above which did not demonstrate clear recurrence though her CEA remains elevated.  Her PET again is otherwise normal without evidence of recurrent disease.  We discussed this today and she is encouraged to proceed with the endoscopy that scheduled approximately a month from now.  We have refilled her medication at this point but will eventually have to proceed to pain management and she is aware of this.  Otherwise plan:      *Flush port flush port monthly  *Return 8 weeks MD  *Endoscopy through GI medicine already scheduled next month

## 2019-08-15 ENCOUNTER — OFFICE VISIT (OUTPATIENT)
Dept: ONCOLOGY | Facility: CLINIC | Age: 69
End: 2019-08-15

## 2019-08-15 ENCOUNTER — LAB (OUTPATIENT)
Dept: LAB | Facility: HOSPITAL | Age: 69
End: 2019-08-15

## 2019-08-15 VITALS
HEIGHT: 66 IN | TEMPERATURE: 98.7 F | WEIGHT: 135 LBS | RESPIRATION RATE: 18 BRPM | HEART RATE: 110 BPM | SYSTOLIC BLOOD PRESSURE: 135 MMHG | DIASTOLIC BLOOD PRESSURE: 68 MMHG | OXYGEN SATURATION: 90 % | BODY MASS INDEX: 21.69 KG/M2

## 2019-08-15 DIAGNOSIS — D12.6 POLYPOSIS ASSOCIATED WITH HETEROZYGOUS MUTATION IN MUTYH GENE: ICD-10-CM

## 2019-08-15 DIAGNOSIS — C49.9 SARCOMA (HCC): ICD-10-CM

## 2019-08-15 DIAGNOSIS — Z85.3 HISTORY OF BREAST CANCER: ICD-10-CM

## 2019-08-15 DIAGNOSIS — Z90.49 H/O TOTAL COLECTOMY: Primary | ICD-10-CM

## 2019-08-15 DIAGNOSIS — R19.04 ABDOMINAL MASS, LLQ (LEFT LOWER QUADRANT): ICD-10-CM

## 2019-08-15 DIAGNOSIS — Z85.038 HISTORY OF COLON CANCER: ICD-10-CM

## 2019-08-15 LAB
BASOPHILS # BLD AUTO: 0.05 10*3/MM3 (ref 0–0.2)
BASOPHILS NFR BLD AUTO: 0.6 % (ref 0–1.5)
DEPRECATED RDW RBC AUTO: 43.4 FL (ref 37–54)
EOSINOPHIL # BLD AUTO: 0.18 10*3/MM3 (ref 0–0.4)
EOSINOPHIL NFR BLD AUTO: 2.2 % (ref 0.3–6.2)
ERYTHROCYTE [DISTWIDTH] IN BLOOD BY AUTOMATED COUNT: 13.2 % (ref 12.3–15.4)
HCT VFR BLD AUTO: 38.2 % (ref 34–46.6)
HGB BLD-MCNC: 12.3 G/DL (ref 12–15.9)
IMM GRANULOCYTES # BLD AUTO: 0.02 10*3/MM3 (ref 0–0.05)
IMM GRANULOCYTES NFR BLD AUTO: 0.2 % (ref 0–0.5)
LYMPHOCYTES # BLD AUTO: 1.32 10*3/MM3 (ref 0.7–3.1)
LYMPHOCYTES NFR BLD AUTO: 16.4 % (ref 19.6–45.3)
MAGNESIUM SERPL-MCNC: 2 MG/DL (ref 1.8–2.5)
MCH RBC QN AUTO: 29.1 PG (ref 26.6–33)
MCHC RBC AUTO-ENTMCNC: 32.2 G/DL (ref 31.5–35.7)
MCV RBC AUTO: 90.5 FL (ref 79–97)
MONOCYTES # BLD AUTO: 0.41 10*3/MM3 (ref 0.1–0.9)
MONOCYTES NFR BLD AUTO: 5.1 % (ref 5–12)
NEUTROPHILS # BLD AUTO: 6.06 10*3/MM3 (ref 1.7–7)
NEUTROPHILS NFR BLD AUTO: 75.5 % (ref 42.7–76)
NRBC BLD AUTO-RTO: 0 /100 WBC (ref 0–0.2)
PLATELET # BLD AUTO: 199 10*3/MM3 (ref 140–450)
PMV BLD AUTO: 9 FL (ref 6–12)
RBC # BLD AUTO: 4.22 10*6/MM3 (ref 3.77–5.28)
WBC NRBC COR # BLD: 8.04 10*3/MM3 (ref 3.4–10.8)

## 2019-08-15 PROCEDURE — 99214 OFFICE O/P EST MOD 30 MIN: CPT | Performed by: INTERNAL MEDICINE

## 2019-08-15 PROCEDURE — 85025 COMPLETE CBC W/AUTO DIFF WBC: CPT

## 2019-08-15 PROCEDURE — 83735 ASSAY OF MAGNESIUM: CPT | Performed by: INTERNAL MEDICINE

## 2019-08-15 PROCEDURE — 36415 COLL VENOUS BLD VENIPUNCTURE: CPT | Performed by: INTERNAL MEDICINE

## 2019-08-15 RX ORDER — TRAMADOL HYDROCHLORIDE 50 MG/1
50 TABLET ORAL EVERY 6 HOURS PRN
Qty: 120 TABLET | Refills: 0 | Status: SHIPPED | OUTPATIENT
Start: 2019-08-15

## 2019-08-23 ENCOUNTER — TELEPHONE (OUTPATIENT)
Dept: GASTROENTEROLOGY | Facility: CLINIC | Age: 69
End: 2019-08-23

## 2019-08-23 NOTE — TELEPHONE ENCOUNTER
Call to pt.  States needs this office to order ostomy supplies for her thru [facility]. States DME would have all supplies listed - just needs verbal order from this office, and then written rx.      States GI MD has always ordered this for her, so needs Dr Baker to start doing this.    Message to Dr Baker.

## 2019-08-23 NOTE — TELEPHONE ENCOUNTER
[facility] [phone] and spoke with Iggy.  Transferred to Tee.  Order placed - identifier is 8/23 @ 5:11 pm.      Order placed for: pouches, barrier seals, gel absorbent tablets, deodorant lube, skin barrier wipes for 99 months.  Tee will also fax written order for signature.      Call to pt to advise of above.  Verb understanding.   Awake/Alert

## 2019-08-23 NOTE — TELEPHONE ENCOUNTER
----- Message from Francoise Kimbrough sent at 8/23/2019 12:23 PM EDT -----  Regarding: Prescription Request  Contact: [phone]  Requested prescription to [facility] for ostomy supplies. Please call patient to followup

## 2019-09-16 ENCOUNTER — ANESTHESIA (OUTPATIENT)
Dept: GASTROENTEROLOGY | Facility: HOSPITAL | Age: 69
End: 2019-09-16

## 2019-09-16 ENCOUNTER — HOSPITAL ENCOUNTER (OUTPATIENT)
Facility: HOSPITAL | Age: 69
Setting detail: HOSPITAL OUTPATIENT SURGERY
Discharge: HOME | End: 2019-09-16
Attending: INTERNAL MEDICINE | Admitting: INTERNAL MEDICINE

## 2019-09-16 ENCOUNTER — ANESTHESIA EVENT (OUTPATIENT)
Dept: GASTROENTEROLOGY | Facility: HOSPITAL | Age: 69
End: 2019-09-16

## 2019-09-16 VITALS
BODY MASS INDEX: 21.28 KG/M2 | TEMPERATURE: 98.3 F | HEART RATE: 71 BPM | SYSTOLIC BLOOD PRESSURE: 106 MMHG | RESPIRATION RATE: 18 BRPM | DIASTOLIC BLOOD PRESSURE: 57 MMHG | HEIGHT: 66 IN | OXYGEN SATURATION: 93 % | WEIGHT: 132.4 LBS

## 2019-09-16 DIAGNOSIS — R10.33 PERIUMBILICAL ABDOMINAL PAIN: ICD-10-CM

## 2019-09-16 DIAGNOSIS — D12.6 POLYPOSIS ASSOCIATED WITH HETEROZYGOUS MUTATION IN MUTYH GENE: ICD-10-CM

## 2019-09-16 PROCEDURE — S0260 H&P FOR SURGERY: HCPCS | Performed by: INTERNAL MEDICINE

## 2019-09-16 PROCEDURE — 88305 TISSUE EXAM BY PATHOLOGIST: CPT | Performed by: INTERNAL MEDICINE

## 2019-09-16 PROCEDURE — 43239 EGD BIOPSY SINGLE/MULTIPLE: CPT | Performed by: INTERNAL MEDICINE

## 2019-09-16 PROCEDURE — 25010000002 PROPOFOL 10 MG/ML EMULSION: Performed by: ANESTHESIOLOGY

## 2019-09-16 RX ORDER — LIDOCAINE HYDROCHLORIDE 20 MG/ML
INJECTION, SOLUTION INFILTRATION; PERINEURAL AS NEEDED
Status: DISCONTINUED | OUTPATIENT
Start: 2019-09-16 | End: 2019-09-16 | Stop reason: SURG

## 2019-09-16 RX ORDER — SODIUM CHLORIDE 0.9 % (FLUSH) 0.9 %
10 SYRINGE (ML) INJECTION AS NEEDED
Status: DISCONTINUED | OUTPATIENT
Start: 2019-09-16 | End: 2019-09-16 | Stop reason: HOSPADM

## 2019-09-16 RX ORDER — SODIUM CHLORIDE, SODIUM LACTATE, POTASSIUM CHLORIDE, CALCIUM CHLORIDE 600; 310; 30; 20 MG/100ML; MG/100ML; MG/100ML; MG/100ML
1000 INJECTION, SOLUTION INTRAVENOUS CONTINUOUS
Status: DISCONTINUED | OUTPATIENT
Start: 2019-09-16 | End: 2019-09-16 | Stop reason: HOSPADM

## 2019-09-16 RX ORDER — PROPOFOL 10 MG/ML
VIAL (ML) INTRAVENOUS AS NEEDED
Status: DISCONTINUED | OUTPATIENT
Start: 2019-09-16 | End: 2019-09-16 | Stop reason: SURG

## 2019-09-16 RX ORDER — PROPOFOL 10 MG/ML
VIAL (ML) INTRAVENOUS CONTINUOUS PRN
Status: DISCONTINUED | OUTPATIENT
Start: 2019-09-16 | End: 2019-09-16 | Stop reason: SURG

## 2019-09-16 RX ORDER — SODIUM CHLORIDE, SODIUM LACTATE, POTASSIUM CHLORIDE, CALCIUM CHLORIDE 600; 310; 30; 20 MG/100ML; MG/100ML; MG/100ML; MG/100ML
30 INJECTION, SOLUTION INTRAVENOUS CONTINUOUS
Status: DISCONTINUED | OUTPATIENT
Start: 2019-09-16 | End: 2019-09-16 | Stop reason: HOSPADM

## 2019-09-16 RX ADMIN — PROPOFOL 100 MCG/KG/MIN: 10 INJECTION, EMULSION INTRAVENOUS at 15:54

## 2019-09-16 RX ADMIN — LIDOCAINE HYDROCHLORIDE 100 MG: 20 INJECTION, SOLUTION INFILTRATION; PERINEURAL at 15:54

## 2019-09-16 RX ADMIN — PROPOFOL 100 MG: 10 INJECTION, EMULSION INTRAVENOUS at 15:54

## 2019-09-16 RX ADMIN — SODIUM CHLORIDE, PRESERVATIVE FREE 500 UNITS: 5 INJECTION INTRAVENOUS at 16:45

## 2019-09-16 NOTE — DISCHARGE INSTRUCTIONS
For the next 24 hours patient needs to be with a responsible adult.    For 24 hours DO NOT drive, operate machinery, appliances, drink alcohol, make important decisions or sign legal documents.    Start with a light or bland diet if you are feeling sick to your stomach otherwise advance to regular diet as tolerated.    Follow recommendations on procedure report if provided by your doctor.    Call Dr Baker for problems [phone]    Problems may include but not limited to: large amounts of bleeding, trouble breathing, repeated vomiting, severe unrelieved pain, fever or chills.

## 2019-09-16 NOTE — ANESTHESIA POSTPROCEDURE EVALUATION
"Patient: Donna Banerjee    Procedure Summary     Date:  09/16/19 Room / Location:   [facility]    Anesthesia Start:  1552 Anesthesia Stop:  1609    Procedure:  ESOPHAGOGASTRODUODENOSCOPY with biopsies (N/A Esophagus) Diagnosis:       Periumbilical abdominal pain      Polyposis associated with heterozygous mutation in MUTYH gene      (Periumbilical abdominal pain [R10.33])      (Polyposis associated with heterozygous mutation in MUTYH gene [D12.6])    Surgeon:  Nelli Baker MD Provider:  Marcia Che MD    Anesthesia Type:  MAC ASA Status:  3          Anesthesia Type: MAC  Last vitals  BP   112/68 (09/16/19 1534)   Temp   36.8 °C (98.3 °F) (09/16/19 1534)   Pulse   86 (09/16/19 1534)   Resp   18 (09/16/19 1534)     SpO2   100 % (09/16/19 1534)     Post Anesthesia Care and Evaluation    Patient location during evaluation: PHASE II  Patient participation: complete - patient participated  Level of consciousness: sleepy but conscious  Pain management: adequate  Airway patency: patent  Anesthetic complications: No anesthetic complications    Cardiovascular status: acceptable  Respiratory status: acceptable  Hydration status: acceptable    Comments: /68 (BP Location: Left arm, Patient Position: Lying)   Pulse 86   Temp 36.8 °C (98.3 °F) (Oral)   Resp 18   Ht 167.6 cm (66\")   Wt 60.1 kg (132 lb 6.4 oz)   SpO2 100%   BMI 21.37 kg/m²         "

## 2019-09-16 NOTE — ANESTHESIA PREPROCEDURE EVALUATION
Anesthesia Evaluation     Patient summary reviewed and Nursing notes reviewed                Airway   Mallampati: II  TM distance: >3 FB  Neck ROM: full  No difficulty expected  Dental    (+) upper dentures    Comment: Few teeth on bottom, none loose    Pulmonary - normal exam   (+) a smoker Current Abstained day of surgery, COPD,   Cardiovascular - normal exam    (+) hyperlipidemia,       Neuro/Psych  (+) psychiatric history Depression,     GI/Hepatic/Renal/Endo      Musculoskeletal     (+) myalgias,       ROS comment: Fibromyalgia  Abdominal  - normal exam   Substance History      OB/GYN          Other   (+) arthritis   history of cancer      Other Comment: Hx of breast and colon CA      Phys Exam Other: Right mediport (will use today for Propofol drip)              Anesthesia Plan    ASA 3     MAC     intravenous induction   Anesthetic plan, all risks, benefits, and alternatives have been provided, discussed and informed consent has been obtained with: patient.

## 2019-09-16 NOTE — H&P
[facility]  Pre Procedure History & Physical    Chief Complaint: MUTYH mutation/ MUTYH associated polyposis (MAP)      HPI: 68 y.o. female with a past medical history noted below who presents for follow-up of history of colon cancer status post ileostomy, MUTYH mutation/ MUTYH associated polyposis (MAP), increased ileostomy output, chronic abdominal pain, reports of abdominal wall mass.       She has never had an EGD.     Remains with chronic pain around her incisions.  Mid abdominal in location.  Now taking tramadol 4 times a day to treat it.     She saw Dr Turner, plans for PET scan later this month.       Eating one meal per day, ensure shakes.       Still emptying ostomy about 3-4 times per day.  She is taking magnesium for a history of leg cramps    Past Medical History:   Past Medical History:   Diagnosis Date   • Acute kidney injury (CMS/HCC)    • Adenovirus infection    • Ascites    • Breast cancer (CMS/HCC) 2014   • Cancer (CMS/HCC) 2016    colon cancer-stage II   • Choledocholithiasis    • Colitis    • COPD (chronic obstructive pulmonary disease) (CMS/HCC)    • Depression    • Diverticulosis    • DJD (degenerative joint disease)    • E. coli UTI    • Emphysema lung (CMS/HCC)    • Emphysema of lung (CMS/HCC)    • Enterovirus infection    • Fibromyalgia    • Gout    • H/O Anemia     Microcytic anemia   • H/O Streptococcus pneumoniae infection 2014   • Herpes simplex type 1 infection    • History of urinary tract infection    • Hyperlipidemia    • Rhinovirus infection    • Staph aureus infection    • Thyroid disease     Hypothyroidism       Family History:  Family History   Problem Relation Age of Onset   • Asthma Brother    • Hyperlipidemia Daughter    • Thyroid cancer Daughter    • Brain cancer Father        Social History:   reports that she has been smoking cigarettes.  She started smoking about 59 years ago. She has been smoking about 0.25 packs per day. She has never used  smokeless tobacco. She reports that she uses drugs. Drug: Marijuana. She reports that she does not drink alcohol.    Medications:   No medications prior to admission.       Allergies:  Latex and Sulfa antibiotics    ROS:    Pertinent items are noted in HPI     Objective     There were no vitals taken for this visit.    Physical Exam   Constitutional: Pt is oriented to person, place, and time and well-developed, well-nourished, and in no distress.   HENT:   Mouth/Throat: Oropharynx is clear and moist.   Neck: Normal range of motion. Neck supple.   Cardiovascular: Normal rate, regular rhythm and normal heart sounds.    Pulmonary/Chest: Effort normal and breath sounds normal. No respiratory distress. No  wheezes.   Abdominal: Soft. Bowel sounds are normal.   Skin: Skin is warm and dry.   Psychiatric: Mood, memory, affect and judgment normal.     Assessment/Plan     Diagnosis:MUTYH mutation/ MUTYH associated polyposis (MAP)      Anticipated Surgical Procedure:  EGD      The risks, benefits, and alternatives of this procedure have been discussed with the patient or the responsible party- the patient understands and agrees to proceed.

## 2019-09-18 LAB
CYTO UR: NORMAL
LAB AP CASE REPORT: NORMAL
PATH REPORT.FINAL DX SPEC: NORMAL
PATH REPORT.GROSS SPEC: NORMAL

## 2019-09-25 ENCOUNTER — TELEPHONE (OUTPATIENT)
Dept: GASTROENTEROLOGY | Facility: CLINIC | Age: 69
End: 2019-09-25

## 2019-09-25 RX ORDER — PANTOPRAZOLE SODIUM 40 MG/1
40 TABLET, DELAYED RELEASE ORAL
Qty: 30 TABLET | Refills: 1 | Status: SHIPPED | OUTPATIENT
Start: 2019-09-25 | End: 2019-09-25 | Stop reason: SDUPTHER

## 2019-09-25 NOTE — TELEPHONE ENCOUNTER
----- Message from Nelli Baker MD sent at 9/19/2019  6:08 PM EDT -----  EGD with mild esophagitis.      Omeprazole every morning    Repeat EGD in 2 years

## 2019-09-25 NOTE — TELEPHONE ENCOUNTER
Returned pt's call and advised per Dr best that her egd showed mild esophagitis     Continue omeprazole every am and repeat egd in 2 yrs.      Pt verb understanding , but reports she has a lot of acid in her sytem. She thinks the omeprazole is not working well.  She reports that her ostomy bag is very bloated and the stools are frothy.  Pt reports in the past she was on 4 pantoprazole per day.  She is asking what should she take. Advised will send message to Dr Best.

## 2019-09-26 NOTE — TELEPHONE ENCOUNTER
Called pt and advised per Dr Baker that she has ordered pantoprazol to replace the omeprazole.  Pt verb understanding.

## 2019-09-27 RX ORDER — PANTOPRAZOLE SODIUM 40 MG/1
40 TABLET, DELAYED RELEASE ORAL
Qty: 90 TABLET | Refills: 1 | Status: SHIPPED | OUTPATIENT
Start: 2019-09-27 | End: 2019-12-02

## 2019-09-27 NOTE — TELEPHONE ENCOUNTER
Message to DR Baker re: 90 pantoprazole request (see note of 9/25).  Pt has appt with DR Baker on 11/18.

## 2019-10-02 ENCOUNTER — INFUSION (OUTPATIENT)
Dept: ONCOLOGY | Facility: HOSPITAL | Age: 69
End: 2019-10-02

## 2019-10-02 ENCOUNTER — OFFICE VISIT (OUTPATIENT)
Dept: ONCOLOGY | Facility: CLINIC | Age: 69
End: 2019-10-02

## 2019-10-02 VITALS
HEIGHT: 66 IN | BODY MASS INDEX: 22.14 KG/M2 | OXYGEN SATURATION: 99 % | WEIGHT: 137.8 LBS | HEART RATE: 86 BPM | RESPIRATION RATE: 18 BRPM | SYSTOLIC BLOOD PRESSURE: 129 MMHG | DIASTOLIC BLOOD PRESSURE: 64 MMHG | TEMPERATURE: 98.1 F

## 2019-10-02 DIAGNOSIS — Z85.038 HISTORY OF COLON CANCER: Primary | ICD-10-CM

## 2019-10-02 DIAGNOSIS — R97.0 ELEVATED CEA: ICD-10-CM

## 2019-10-02 DIAGNOSIS — D12.6 POLYPOSIS ASSOCIATED WITH HETEROZYGOUS MUTATION IN MUTYH GENE: Primary | ICD-10-CM

## 2019-10-02 DIAGNOSIS — R97.0 ELEVATED CARCINOEMBRYONIC ANTIGEN (CEA): ICD-10-CM

## 2019-10-02 DIAGNOSIS — D12.6 POLYPOSIS ASSOCIATED WITH HETEROZYGOUS MUTATION IN MUTYH GENE: ICD-10-CM

## 2019-10-02 DIAGNOSIS — Z45.2 FITTING AND ADJUSTMENT OF VASCULAR CATHETER: Primary | ICD-10-CM

## 2019-10-02 LAB
BASOPHILS # BLD AUTO: 0.06 10*3/MM3 (ref 0–0.2)
BASOPHILS NFR BLD AUTO: 0.6 % (ref 0–1.5)
CEA SERPL-MCNC: 13.2 NG/ML
DEPRECATED RDW RBC AUTO: 43.8 FL (ref 37–54)
EOSINOPHIL # BLD AUTO: 0.4 10*3/MM3 (ref 0–0.4)
EOSINOPHIL NFR BLD AUTO: 4.3 % (ref 0.3–6.2)
ERYTHROCYTE [DISTWIDTH] IN BLOOD BY AUTOMATED COUNT: 13 % (ref 12.3–15.4)
HCT VFR BLD AUTO: 35.8 % (ref 34–46.6)
HGB BLD-MCNC: 11.8 G/DL (ref 12–15.9)
IMM GRANULOCYTES # BLD AUTO: 0.1 10*3/MM3 (ref 0–0.05)
IMM GRANULOCYTES NFR BLD AUTO: 1.1 % (ref 0–0.5)
LYMPHOCYTES # BLD AUTO: 1.37 10*3/MM3 (ref 0.7–3.1)
LYMPHOCYTES NFR BLD AUTO: 14.6 % (ref 19.6–45.3)
MCH RBC QN AUTO: 30.2 PG (ref 26.6–33)
MCHC RBC AUTO-ENTMCNC: 33 G/DL (ref 31.5–35.7)
MCV RBC AUTO: 91.6 FL (ref 79–97)
MONOCYTES # BLD AUTO: 0.68 10*3/MM3 (ref 0.1–0.9)
MONOCYTES NFR BLD AUTO: 7.2 % (ref 5–12)
NEUTROPHILS # BLD AUTO: 6.78 10*3/MM3 (ref 1.7–7)
NEUTROPHILS NFR BLD AUTO: 72.2 % (ref 42.7–76)
NRBC BLD AUTO-RTO: 0.2 /100 WBC (ref 0–0.2)
PLATELET # BLD AUTO: 209 10*3/MM3 (ref 140–450)
PMV BLD AUTO: 9.2 FL (ref 6–12)
RBC # BLD AUTO: 3.91 10*6/MM3 (ref 3.77–5.28)
WBC NRBC COR # BLD: 9.39 10*3/MM3 (ref 3.4–10.8)

## 2019-10-02 PROCEDURE — 36593 DECLOT VASCULAR DEVICE: CPT

## 2019-10-02 PROCEDURE — 85025 COMPLETE CBC W/AUTO DIFF WBC: CPT

## 2019-10-02 PROCEDURE — 25010000002 ALTEPLASE 2 MG RECONSTITUTED SOLUTION: Performed by: INTERNAL MEDICINE

## 2019-10-02 PROCEDURE — 99214 OFFICE O/P EST MOD 30 MIN: CPT | Performed by: INTERNAL MEDICINE

## 2019-10-02 PROCEDURE — 82378 CARCINOEMBRYONIC ANTIGEN: CPT | Performed by: INTERNAL MEDICINE

## 2019-10-02 RX ORDER — HEPARIN SODIUM (PORCINE) LOCK FLUSH IV SOLN 100 UNIT/ML 100 UNIT/ML
500 SOLUTION INTRAVENOUS AS NEEDED
OUTPATIENT
Start: 2019-10-02

## 2019-10-02 RX ORDER — SODIUM CHLORIDE 0.9 % (FLUSH) 0.9 %
10 SYRINGE (ML) INJECTION AS NEEDED
OUTPATIENT
Start: 2019-10-02

## 2019-10-02 RX ORDER — SODIUM CHLORIDE 0.9 % (FLUSH) 0.9 %
10 SYRINGE (ML) INJECTION AS NEEDED
Status: DISCONTINUED | OUTPATIENT
Start: 2019-10-02 | End: 2019-10-02 | Stop reason: HOSPADM

## 2019-10-02 RX ADMIN — SODIUM CHLORIDE, PRESERVATIVE FREE 10 ML: 5 INJECTION INTRAVENOUS at 15:32

## 2019-10-02 RX ADMIN — ALTEPLASE: 2.2 INJECTION, POWDER, LYOPHILIZED, FOR SOLUTION INTRAVENOUS at 14:26

## 2019-10-02 RX ADMIN — Medication 500 UNITS: at 15:32

## 2019-10-02 NOTE — PROGRESS NOTES
Pt here for labs and to see Dr. Turner. Pt port doesn't get blood. She states it hasn't gotten blood for awhile; however, she would like to try activase to see if we can get it to work. Activase instilled @1426 and left to dwell. Pt will come back over to have removed when finished seeing Dr. Turner. Pt came back to the lab at 1520. Tried to get blood return. Unable to get blood return.Removed activase and flushed per protocol.

## 2019-12-02 RX ORDER — ALBUTEROL SULFATE 90 UG/1
2 AEROSOL, METERED RESPIRATORY (INHALATION) EVERY 4 HOURS PRN
COMMUNITY

## 2019-12-02 RX ORDER — LOPERAMIDE HYDROCHLORIDE 2 MG/1
2 CAPSULE ORAL 3 TIMES DAILY
COMMUNITY

## 2019-12-02 RX ORDER — PANTOPRAZOLE SODIUM 40 MG/1
40 TABLET, DELAYED RELEASE ORAL DAILY
COMMUNITY

## 2019-12-03 ENCOUNTER — ANESTHESIA (OUTPATIENT)
Dept: GASTROENTEROLOGY | Facility: HOSPITAL | Age: 69
End: 2019-12-03

## 2019-12-03 ENCOUNTER — ANESTHESIA EVENT (OUTPATIENT)
Dept: GASTROENTEROLOGY | Facility: HOSPITAL | Age: 69
End: 2019-12-03

## 2019-12-03 ENCOUNTER — HOSPITAL ENCOUNTER (OUTPATIENT)
Facility: HOSPITAL | Age: 69
Setting detail: HOSPITAL OUTPATIENT SURGERY
Discharge: HOME | End: 2019-12-03
Attending: INTERNAL MEDICINE | Admitting: INTERNAL MEDICINE

## 2019-12-03 VITALS
WEIGHT: 144.9 LBS | RESPIRATION RATE: 16 BRPM | BODY MASS INDEX: 23.29 KG/M2 | SYSTOLIC BLOOD PRESSURE: 113 MMHG | TEMPERATURE: 97.7 F | OXYGEN SATURATION: 99 % | HEART RATE: 70 BPM | DIASTOLIC BLOOD PRESSURE: 64 MMHG | HEIGHT: 66 IN

## 2019-12-03 PROCEDURE — 25010000002 PROPOFOL 10 MG/ML EMULSION: Performed by: ANESTHESIOLOGY

## 2019-12-03 PROCEDURE — S0260 H&P FOR SURGERY: HCPCS | Performed by: INTERNAL MEDICINE

## 2019-12-03 PROCEDURE — 45330 DIAGNOSTIC SIGMOIDOSCOPY: CPT | Performed by: INTERNAL MEDICINE

## 2019-12-03 RX ORDER — PROPOFOL 10 MG/ML
VIAL (ML) INTRAVENOUS CONTINUOUS PRN
Status: DISCONTINUED | OUTPATIENT
Start: 2019-12-03 | End: 2019-12-03 | Stop reason: SURG

## 2019-12-03 RX ORDER — PROPOFOL 10 MG/ML
VIAL (ML) INTRAVENOUS AS NEEDED
Status: DISCONTINUED | OUTPATIENT
Start: 2019-12-03 | End: 2019-12-03 | Stop reason: SURG

## 2019-12-03 RX ORDER — SODIUM CHLORIDE, SODIUM LACTATE, POTASSIUM CHLORIDE, CALCIUM CHLORIDE 600; 310; 30; 20 MG/100ML; MG/100ML; MG/100ML; MG/100ML
30 INJECTION, SOLUTION INTRAVENOUS CONTINUOUS PRN
Status: DISCONTINUED | OUTPATIENT
Start: 2019-12-03 | End: 2019-12-03 | Stop reason: HOSPADM

## 2019-12-03 RX ADMIN — HEPARIN 500 UNITS: 100 SYRINGE at 10:28

## 2019-12-03 RX ADMIN — PROPOFOL 100 MG: 10 INJECTION, EMULSION INTRAVENOUS at 09:44

## 2019-12-03 RX ADMIN — SODIUM CHLORIDE, POTASSIUM CHLORIDE, SODIUM LACTATE AND CALCIUM CHLORIDE 30 ML/HR: 600; 310; 30; 20 INJECTION, SOLUTION INTRAVENOUS at 09:36

## 2019-12-03 RX ADMIN — PROPOFOL 140 MCG/KG/MIN: 10 INJECTION, EMULSION INTRAVENOUS at 09:44

## 2019-12-03 NOTE — DISCHARGE INSTRUCTIONS
Flexible Sigmoidoscopy, Care After  This sheet gives you information about how to care for yourself after your procedure. Your health care provider may also give you more specific instructions. If you have problems or questions, contact your health care provider.  What can I expect after the procedure?  After the procedure, it is common to have:  · Abdominal cramping or pain.  · Bloating.  · A small amount of rectal bleeding if you had a biopsy.  Follow these instructions at home:  · Take over-the-counter and prescription medicines only as told by your health care provider.  · Do not drive for 24 hours if you received a medicine to help you relax (sedative).  · Keep all follow-up visits as told by your health care provider. This is important.  Contact a health care provider if:  · You have abdominal pain or cramping that gets worse or is not helped with medicine.  · You continue to have small amounts of rectal bleeding after 24 hours.  · You have nausea or vomiting.  · You feel weak or dizzy.  · You have a fever.  Get help right away if:  · You pass large blood clots or see a large amount of blood in the toilet after having a bowel movement.  · You have nausea or vomiting for more than 24 hours after the procedure.  This information is not intended to replace advice given to you by your health care provider. Make sure you discuss any questions you have with your health care provider.  Document Released: 12/23/2014 Document Revised: 07/07/2017 Document Reviewed: 03/18/2017  [employer] Patient Education © 2019 [employer]

## 2019-12-03 NOTE — H&P
[facility]  Pre Procedure History & Physical    Chief Complaint: MUTYH mutation/ MUTYH associated polyposis (MAP)      HPI: Donna Banerjee is a 68 y.o. female with a past medical history noted below who presents for follow-up of history of colon cancer status post ileostomy, MUTYH mutation/ MUTYH associated polyposis (MAP), increased ileostomy output, chronic abdominal pain, reports of abdominal wall mass.       She has never had an EGD.     Remains with chronic pain around her incisions.  Mid abdominal in location.  Now taking tramadol 4 times a day to treat it.     She saw Dr Turner, plans for PET scan later this month.       Eating one meal per day, ensure shakes.       Still emptying ostomy about 3-4 times per day.  She is taking magnesium for a history of leg cramps    Past Medical History:   Past Medical History:   Diagnosis Date   • Breast cancer (CMS/HCC) 2014   • Cancer (CMS/HCC) 2016    colon cancer-stage II   • Colitis    • COPD (chronic obstructive pulmonary disease) (CMS/HCC)    • CRF (chronic renal failure), stage 3 (moderate) (CMS/HCC)    • DDD (degenerative disc disease), lumbar    • Depression    • Diverticulosis    • DJD (degenerative joint disease)    • Eczema    • Emphysema lung (CMS/HCC)    • Emphysema of lung (CMS/HCC)    • Fibromyalgia    • Gout    • H/O Anemia     Microcytic anemia   • H/O Streptococcus pneumoniae infection 2014   • Herpes simplex type 1 infection    • High triglycerides    • History of chemotherapy    • History of urinary tract infection    • Ileostomy in place (CMS/HCC)    • Neuropathy    • Port-A-Cath in place    • Thyroid disease     Hypothyroidism       Family History:  Family History   Problem Relation Age of Onset   • Asthma Brother    • Hyperlipidemia Daughter    • Thyroid cancer Daughter    • Brain cancer Father    • Malig Hyperthermia Neg Hx        Social History:   reports that she quit smoking about 6 months ago. Her smoking use included  "cigarettes. She started smoking about 59 years ago. She smoked 0.25 packs per day. She has never used smokeless tobacco. She reports that she uses drugs. Drug: Marijuana. Frequency: 7.00 times per week. She reports that she does not drink alcohol.    Medications:   No medications prior to admission.       Allergies:  Latex; Celebrex [celecoxib]; and Sulfa antibiotics    ROS:    Pertinent items are noted in HPI     Objective     Height 167.6 cm (66\").    Physical Exam   Constitutional: Pt is oriented to person, place, and time and well-developed, well-nourished, and in no distress.   HENT:   Mouth/Throat: Oropharynx is clear and moist.   Neck: Normal range of motion. Neck supple.   Cardiovascular: Normal rate, regular rhythm and normal heart sounds.    Pulmonary/Chest: Effort normal and breath sounds normal. No respiratory distress. No  wheezes.   Abdominal: Soft. Bowel sounds are normal.   Skin: Skin is warm and dry.   Psychiatric: Mood, memory, affect and judgment normal.     Assessment/Plan     Diagnosis: MUTYH mutation/ MUTYH associated polyposis (MAP)      Anticipated Surgical Procedure:  Flex sig    The risks, benefits, and alternatives of this procedure have been discussed with the patient or the responsible party- the patient understands and agrees to proceed.  "

## 2019-12-03 NOTE — ANESTHESIA PREPROCEDURE EVALUATION
Anesthesia Evaluation     Patient summary reviewed and Nursing notes reviewed   no history of anesthetic complications:               Airway   Mallampati: II  TM distance: >3 FB  Neck ROM: full  no difficulty expected  Dental - normal exam     Pulmonary     breath sounds clear to auscultation  (+) COPD,   (-) shortness of breath, sleep apnea, decreased breath sounds, wheezes  Cardiovascular - normal exam  Exercise tolerance: good (4-7 METS)    Rhythm: regular  Rate: normal    (+) hyperlipidemia,   (-) past MI, angina, CHF, orthopnea, PND, MAHONEY, PVD      Neuro/Psych- negative ROS  (-) seizures, neuromuscular disease, TIA, CVA, dizziness/light headedness, weakness, numbness  GI/Hepatic/Renal/Endo    (+)   renal disease CRI,   (-) liver disease, diabetes    Musculoskeletal     (+) chronic pain,   Abdominal  - normal exam   Substance History - negative use  (-) alcohol use, drug use     OB/GYN negative ob/gyn ROS         Other   arthritis,    history of cancer                    Anesthesia Plan    ASA 3     MAC     intravenous induction     Anesthetic plan, all risks, benefits, and alternatives have been provided, discussed and informed consent has been obtained with: patient.

## 2019-12-03 NOTE — ANESTHESIA POSTPROCEDURE EVALUATION
"Patient: Donna Banerjee    Procedure Summary     Date:  12/03/19 Room / Location:  [facility] /  [facility]    Anesthesia Start:  0938 Anesthesia Stop:  1006    Procedure:  SIGMOIDOSCOPY FLEXIBLE (N/A ) Diagnosis:       History of colon cancer      (History of colon cancer [Z85.038])    Surgeon:  Nelli Baker MD Provider:  Igncaio Miller MD    Anesthesia Type:  MAC ASA Status:  3          Anesthesia Type: MAC  Last vitals  BP   113/64 (12/03/19 1028)   Temp   36.5 °C (97.7 °F) (12/03/19 0927)   Pulse   70 (12/03/19 1028)   Resp   16 (12/03/19 1028)     SpO2   99 % (12/03/19 1028)     Post Anesthesia Care and Evaluation    Patient location during evaluation: bedside  Patient participation: complete - patient participated  Level of consciousness: awake and alert  Pain management: adequate  Airway patency: patent  Anesthetic complications: No anesthetic complications    Cardiovascular status: acceptable  Respiratory status: acceptable  Hydration status: acceptable    Comments: /64 (BP Location: Right arm, Patient Position: Lying)   Pulse 70   Temp 36.5 °C (97.7 °F) (Oral)   Resp 16   Ht 167.6 cm (66\")   Wt 65.7 kg (144 lb 14.4 oz)   SpO2 99%   BMI 23.39 kg/m²       "

## 2020-01-09 ENCOUNTER — TELEPHONE (OUTPATIENT)
Dept: GASTROENTEROLOGY | Facility: CLINIC | Age: 70
End: 2020-01-09

## 2022-01-19 ENCOUNTER — TELEPHONE (OUTPATIENT)
Dept: GASTROENTEROLOGY | Facility: CLINIC | Age: 72
End: 2022-01-19

## 2022-01-19 NOTE — TELEPHONE ENCOUNTER
----- Message from Tian Castro sent at 1/19/2022 10:49 AM EST -----  Contact: [phone]  supplies for her thru [employer] pouches, barrier seals, gel absorbent tablets,skin barrier wipes for 99 months. They said they faxed out a RX form on 1/14/22 that needs to be filled out by the doctor and regular RX from isn't accepted.

## 2022-01-19 NOTE — TELEPHONE ENCOUNTER
"Form signed and faxed to [employer] at [phone] and fax confirmation received.  Form scanned under media tab.     Advised pt that we have not seen her since 12/2019 and she is due for a f/u.  Advised in order for future refills, pt needs to make appt.  Offered to make appt and pt states that \"she is into something right now and she will call back\".     Update sent to Dr Baker.   "

## 2022-01-19 NOTE — TELEPHONE ENCOUNTER
Called pt and advised pt that we have not received the form for which she is speaking of . Advised pt we will call [facility] and ask them to resend the form. Verb understanding.     Called [facility] at [phone] and spoke with Rochelle and she is going to refax form to [phone].

## (undated) DEVICE — FRCP BX RADJAW4 NDL 2.8 240CM LG OG BX40

## (undated) DEVICE — CANN NASL CO2 TRULINK W/O2 A/

## (undated) DEVICE — TUBING, SUCTION, 1/4" X 10', STRAIGHT: Brand: MEDLINE

## (undated) DEVICE — SINGLE-USE BIOPSY FORCEPS: Brand: RADIAL JAW 4

## (undated) DEVICE — Device: Brand: DEFENDO AIR/WATER/SUCTION AND BIOPSY VALVE

## (undated) DEVICE — SENSR O2 OXIMAX FNGR A/ 18IN NONSTR

## (undated) DEVICE — BITEBLOCK OMNI BLOC

## (undated) DEVICE — THE TORRENT IRRIGATION SCOPE CONNECTOR IS USED WITH THE TORRENT IRRIGATION TUBING TO PROVIDE IRRIGATION FLUIDS SUCH AS STERILE WATER DURING GASTROINTESTINAL ENDOSCOPIC PROCEDURES WHEN USED IN CONJUNCTION WITH AN IRRIGATION PUMP (OR ELECTROSURGICAL UNIT).: Brand: TORRENT

## (undated) DEVICE — KT VLV BIOGUARD SXN BIOP AIR/H20 CONN 4PC DISP